# Patient Record
Sex: MALE | Employment: FULL TIME | URBAN - METROPOLITAN AREA
[De-identification: names, ages, dates, MRNs, and addresses within clinical notes are randomized per-mention and may not be internally consistent; named-entity substitution may affect disease eponyms.]

---

## 2022-11-04 ENCOUNTER — TELEPHONE (OUTPATIENT)
Dept: SCHEDULING | Facility: IMAGING CENTER | Age: 22
End: 2022-11-04

## 2023-03-17 ENCOUNTER — OFFICE VISIT (OUTPATIENT)
Dept: URGENT CARE | Facility: CLINIC | Age: 23
End: 2023-03-17
Payer: OTHER GOVERNMENT

## 2023-03-17 VITALS
OXYGEN SATURATION: 94 % | HEIGHT: 69 IN | DIASTOLIC BLOOD PRESSURE: 80 MMHG | WEIGHT: 181 LBS | SYSTOLIC BLOOD PRESSURE: 120 MMHG | HEART RATE: 74 BPM | TEMPERATURE: 97.3 F | RESPIRATION RATE: 16 BRPM | BODY MASS INDEX: 26.81 KG/M2

## 2023-03-17 DIAGNOSIS — J02.9 SORE THROAT: ICD-10-CM

## 2023-03-17 DIAGNOSIS — Z20.818 EXPOSURE TO STREPTOCOCCAL PHARYNGITIS: ICD-10-CM

## 2023-03-17 LAB — S PYO DNA SPEC NAA+PROBE: NOT DETECTED

## 2023-03-17 PROCEDURE — 99203 OFFICE O/P NEW LOW 30 MIN: CPT | Performed by: PHYSICIAN ASSISTANT

## 2023-03-17 PROCEDURE — 87651 STREP A DNA AMP PROBE: CPT | Performed by: PHYSICIAN ASSISTANT

## 2023-03-17 RX ORDER — AMOXICILLIN 500 MG/1
500 CAPSULE ORAL 2 TIMES DAILY
Qty: 20 CAPSULE | Refills: 0 | Status: SHIPPED | OUTPATIENT
Start: 2023-03-17 | End: 2023-03-27

## 2023-03-17 NOTE — PROGRESS NOTES
"Subjective:   Kem Suárez is a 22 y.o. male who presents for Pharyngitis (X 2 days, chills, fatigue, sore throat, body aches, congestion, cough, runny nose, headaches)     Patient presents with chief complaint of ST x 2 days.  Some chills.  Mild cough, runny nose congestion. Sister had strep last week.  Painful swallow.  Eating and drinking with some difficulty.  Denies shortness of breath.  No COVID concerns.  Home COVID test yesterday negative.  No underlying respiratory illnesses.        Medications:  SODIUM FLUORIDE (DENTAL GEL) Gel    Allergies:             Patient has no allergy information on record.    Surgical History:       No past surgical history on file.    Past Social Hx:  Kem Suárez  reports that he has never smoked. He has never used smokeless tobacco. He reports that he does not drink alcohol and does not use drugs.     Past Family Hx:   Kem Suárez family history is not on file.       Problem list, medications, and allergies reviewed by myself today in Epic.     Objective:     /80   Pulse 74   Temp 36.3 °C (97.3 °F) (Temporal)   Resp 16   Ht 1.753 m (5' 9\")   Wt 82.1 kg (181 lb)   SpO2 94%   BMI 26.73 kg/m²     Physical Exam  Vitals and nursing note reviewed.   Constitutional:       General: He is not in acute distress.     Appearance: Normal appearance. He is well-developed. He is not ill-appearing or diaphoretic.   HENT:      Head: Normocephalic.      Right Ear: Tympanic membrane normal.      Left Ear: Tympanic membrane normal.      Nose: Congestion and rhinorrhea present.      Mouth/Throat:      Palate: No mass.      Pharynx: Posterior oropharyngeal erythema present. No pharyngeal swelling, oropharyngeal exudate or uvula swelling.      Tonsils: No tonsillar exudate or tonsillar abscesses. 1+ on the right. 1+ on the left.   Eyes:      Conjunctiva/sclera: Conjunctivae normal.      Pupils: Pupils are equal, round, and reactive to light.   Cardiovascular:      Rate and " Rhythm: Normal rate and regular rhythm.      Pulses: Normal pulses.      Heart sounds: No murmur heard.  Pulmonary:      Effort: Pulmonary effort is normal. No tachypnea, accessory muscle usage or respiratory distress.      Breath sounds: Normal breath sounds. No stridor. No decreased breath sounds, wheezing, rhonchi or rales.   Musculoskeletal:         General: Normal range of motion.      Cervical back: Normal range of motion and neck supple.   Skin:     General: Skin is warm and dry.   Neurological:      Mental Status: He is alert and oriented to person, place, and time.   Psychiatric:         Behavior: Behavior is cooperative.     Results for orders placed or performed in visit on 03/17/23   POCT GROUP A STREP, PCR   Result Value Ref Range    POC Group A Strep, PCR Not Detected Not Detected, Invalid       Assessment/Plan:     Diagnosis and Associated Orders:     1. Sore throat  - POCT GROUP A STREP, PCR        Comments/MDM:    Cepheid strep testing negative.  Suspect viral etiology.  Results communicated to patient via MyChart.  I did let the patient know that I will send a prescription for amoxicillin as he did has a household contact with strep pharyngitis.  He is advised to start the medication if his symptoms worsen or he develops fever chills or other constitutional symptoms.    The patient's presenting symptoms and exam findings most likely are due to a viral etiology.     Symptomatic and supportive care:   Plenty of oral hydration and rest   Over the counter cough suppressant as directed.  Tylenol or ibuprofen for pain and fever as directed.   Warm salt water gargles for sore throat, soft foods, cool liquids.   Saline nasal spray, Flonase, and/or otc sudafed (if no history of hypertension) as a decongestant.   Infection control measures at home. Stay away from people, Hand washing, covering sneeze/cough, disinfect surfaces.   Remain home from work, school, and other populated environments while  ill.  Overall, the patient is well-appearing. They are not hypoxic, afebrile, and a normal pulmonary exam.      Soller gargles, Tylenol/ibuprofen as needed for pain, soft foods, cool liquids.  Swap out  toothbrush in 2 to 3 days time to avoid reinfection.  Patient should to proceed to ED for development of symptoms including but not limited to shortness of breath breath, respiratory distress, increased fever, or worsening symptoms not manageable at home.    I personally reviewed prior external notes and test results pertinent to today's visit.  Red flags discussed as well as indications to present to the Emergency Department.  Supportive care, natural history, differential diagnoses, and indications for immediate follow-up discussed.  Patient expresses understanding and agrees to plan.  Patient denies any other questions or concerns.    Follow-up with the primary care physician for recheck, reevaluation, and consideration of further management.      Please note that this dictation was created using voice recognition software. I have made a reasonable attempt to correct obvious errors, but I expect that there are errors of grammar and possibly content that I did not discover before finalizing the note.    This note was electronically signed by Emelia Lopez PA-C

## 2023-04-05 ENCOUNTER — OFFICE VISIT (OUTPATIENT)
Dept: URGENT CARE | Facility: CLINIC | Age: 23
End: 2023-04-05
Payer: OTHER GOVERNMENT

## 2023-04-05 VITALS
HEIGHT: 69 IN | WEIGHT: 180 LBS | TEMPERATURE: 97.7 F | RESPIRATION RATE: 14 BRPM | HEART RATE: 95 BPM | OXYGEN SATURATION: 94 % | SYSTOLIC BLOOD PRESSURE: 116 MMHG | DIASTOLIC BLOOD PRESSURE: 84 MMHG | BODY MASS INDEX: 26.66 KG/M2

## 2023-04-05 DIAGNOSIS — R05.3 PERSISTENT COUGH FOR 3 WEEKS OR LONGER: ICD-10-CM

## 2023-04-05 DIAGNOSIS — J02.9 PHARYNGITIS, UNSPECIFIED ETIOLOGY: ICD-10-CM

## 2023-04-05 LAB — S PYO DNA SPEC NAA+PROBE: NOT DETECTED

## 2023-04-05 PROCEDURE — 87651 STREP A DNA AMP PROBE: CPT | Performed by: PHYSICIAN ASSISTANT

## 2023-04-05 PROCEDURE — 99213 OFFICE O/P EST LOW 20 MIN: CPT | Performed by: PHYSICIAN ASSISTANT

## 2023-04-05 RX ORDER — AZITHROMYCIN 250 MG/1
TABLET, FILM COATED ORAL
Qty: 6 TABLET | Refills: 0 | Status: SHIPPED | OUTPATIENT
Start: 2023-04-05

## 2023-04-05 RX ORDER — METHYLPREDNISOLONE 4 MG/1
TABLET ORAL
Qty: 21 TABLET | Refills: 0 | Status: SHIPPED | OUTPATIENT
Start: 2023-04-05

## 2023-04-05 ASSESSMENT — ENCOUNTER SYMPTOMS
CHILLS: 1
MYALGIAS: 0
WHEEZING: 0
COUGH: 1
NAUSEA: 0
SPUTUM PRODUCTION: 1
ABDOMINAL PAIN: 0
HEADACHES: 0
DIARRHEA: 0
HEMOPTYSIS: 0
SORE THROAT: 1
SHORTNESS OF BREATH: 0
VOMITING: 0
FEVER: 0

## 2023-04-05 NOTE — PROGRESS NOTES
"Subjective     Kem Suárez is a 22 y.o. male who presents with Sore Throat (X 3 days with congestion, cough.  Denies fever or chills.)            Patient with complaints of cough and sore throat x 3 weeks. Symptoms improved but never went away. Symptoms worsened 3 days ago. He now reports severe sore throat. He has nasal congestion. Chills from 3 weeks ago but nothing since. No known fever. Persistent cough with a mixture between green, yellow and bloody mucous. No wheezing or shortness of breath. No nausea, vomiting, or diarrhea. No body aches.         No past medical history on file.        No past surgical history on file.      No family history on file.      Patient has no known allergies.        Medications, Allergies, and current problem list reviewed today in Epic      Review of Systems   Constitutional:  Positive for chills and malaise/fatigue. Negative for fever.   HENT:  Positive for congestion and sore throat. Negative for ear pain.    Respiratory:  Positive for cough and sputum production. Negative for hemoptysis, shortness of breath and wheezing.    Cardiovascular:  Negative for chest pain and leg swelling.   Gastrointestinal:  Negative for abdominal pain, diarrhea, nausea and vomiting.   Musculoskeletal:  Negative for myalgias.   Skin:  Negative for rash.   Neurological:  Negative for headaches.          All other systems reviewed and are negative.         Objective     /84   Pulse 95   Temp 36.5 °C (97.7 °F) (Temporal)   Resp 14   Ht 1.753 m (5' 9\")   Wt 81.6 kg (180 lb)   SpO2 94%   BMI 26.58 kg/m²      Physical Exam  Constitutional:       General: He is not in acute distress.     Appearance: Normal appearance. He is not ill-appearing.   HENT:      Head: Normocephalic and atraumatic.      Right Ear: Tympanic membrane, ear canal and external ear normal.      Left Ear: Tympanic membrane, ear canal and external ear normal.      Nose: Mucosal edema, congestion and rhinorrhea present. " Rhinorrhea is purulent.      Mouth/Throat:      Mouth: Mucous membranes are moist.      Pharynx: Uvula midline. Pharyngeal swelling, oropharyngeal exudate, posterior oropharyngeal erythema and uvula swelling present.      Tonsils: No tonsillar exudate or tonsillar abscesses. 1+ on the right. 1+ on the left.   Cardiovascular:      Rate and Rhythm: Normal rate and regular rhythm.      Heart sounds: Normal heart sounds.   Pulmonary:      Effort: Pulmonary effort is normal. No respiratory distress.      Breath sounds: Normal breath sounds. No wheezing, rhonchi or rales.   Lymphadenopathy:      Cervical: Cervical adenopathy (slight) present.   Skin:     General: Skin is warm and dry.      Findings: No rash.   Neurological:      General: No focal deficit present.      Mental Status: He is alert and oriented to person, place, and time.   Psychiatric:         Mood and Affect: Mood normal.         Behavior: Behavior normal.         Thought Content: Thought content normal.         Judgment: Judgment normal.                           Assessment & Plan        1. Pharyngitis, unspecified etiology  POCT GROUP A STREP, PCR    azithromycin (ZITHROMAX) 250 MG Tab    methylPREDNISolone (MEDROL DOSEPAK) 4 MG Tablet Therapy Pack      2. Persistent cough for 3 weeks or longer  azithromycin (ZITHROMAX) 250 MG Tab    methylPREDNISolone (MEDROL DOSEPAK) 4 MG Tablet Therapy Pack          - POCT GROUP A STREP, PCR- negative           Current Outpatient Medications:     azithromycin (ZITHROMAX) 250 MG Tab, Take as directed on package. 1 pack., Disp: 6 Tablet, Rfl: 0    methylPREDNISolone (MEDROL DOSEPAK) 4 MG Tablet Therapy Pack, Follow schedule on package instructions., Disp: 21 Tablet, Rfl: 0        Differential diagnoses, Supportive care, and indications for immediate follow-up discussed with patient.   Pathogenesis of diagnosis discussed including typical length and natural progression.   Instructed to return to clinic or nearest  emergency department for any change in condition, further concerns, or worsening of symptoms.        The patient demonstrated a good understanding and agreed with the treatment plan.      Pam Fraire P.A.-C.

## 2024-03-04 ENCOUNTER — OFFICE VISIT (OUTPATIENT)
Dept: MEDICAL GROUP | Facility: PHYSICIAN GROUP | Age: 24
End: 2024-03-04
Payer: OTHER GOVERNMENT

## 2024-03-04 VITALS
HEIGHT: 70 IN | WEIGHT: 176.6 LBS | BODY MASS INDEX: 25.28 KG/M2 | DIASTOLIC BLOOD PRESSURE: 84 MMHG | TEMPERATURE: 97.5 F | OXYGEN SATURATION: 96 % | HEART RATE: 62 BPM | SYSTOLIC BLOOD PRESSURE: 120 MMHG | RESPIRATION RATE: 10 BRPM

## 2024-03-04 DIAGNOSIS — Z11.4 SCREENING FOR HIV (HUMAN IMMUNODEFICIENCY VIRUS): ICD-10-CM

## 2024-03-04 DIAGNOSIS — L70.0 ACNE VULGARIS: ICD-10-CM

## 2024-03-04 DIAGNOSIS — F43.21 SITUATIONAL DEPRESSION: ICD-10-CM

## 2024-03-04 DIAGNOSIS — Z13.228 SCREENING FOR ENDOCRINE, NUTRITIONAL, METABOLIC AND IMMUNITY DISORDER: ICD-10-CM

## 2024-03-04 DIAGNOSIS — Z13.29 SCREENING FOR ENDOCRINE, NUTRITIONAL, METABOLIC AND IMMUNITY DISORDER: ICD-10-CM

## 2024-03-04 DIAGNOSIS — F90.1 ATTENTION DEFICIT HYPERACTIVITY DISORDER (ADHD), PREDOMINANTLY HYPERACTIVE TYPE: ICD-10-CM

## 2024-03-04 DIAGNOSIS — Z13.0 SCREENING FOR IRON DEFICIENCY ANEMIA: ICD-10-CM

## 2024-03-04 DIAGNOSIS — Z11.59 NEED FOR HEPATITIS C SCREENING TEST: ICD-10-CM

## 2024-03-04 DIAGNOSIS — Z91.09 ENVIRONMENTAL ALLERGIES: ICD-10-CM

## 2024-03-04 DIAGNOSIS — Z13.0 SCREENING FOR ENDOCRINE, NUTRITIONAL, METABOLIC AND IMMUNITY DISORDER: ICD-10-CM

## 2024-03-04 DIAGNOSIS — Z13.21 SCREENING FOR ENDOCRINE, NUTRITIONAL, METABOLIC AND IMMUNITY DISORDER: ICD-10-CM

## 2024-03-04 PROBLEM — L98.9 SKIN PROBLEM: Status: RESOLVED | Noted: 2024-03-04 | Resolved: 2024-03-04

## 2024-03-04 PROBLEM — L98.9 SKIN PROBLEM: Status: ACTIVE | Noted: 2024-03-04

## 2024-03-04 PROBLEM — F90.9 ADHD: Status: ACTIVE | Noted: 2024-03-04

## 2024-03-04 PROBLEM — T78.40XA ALLERGY: Status: ACTIVE | Noted: 2024-03-04

## 2024-03-04 PROBLEM — F33.1 MODERATE EPISODE OF RECURRENT MAJOR DEPRESSIVE DISORDER (HCC): Status: ACTIVE | Noted: 2024-03-04

## 2024-03-04 PROCEDURE — 3079F DIAST BP 80-89 MM HG: CPT

## 2024-03-04 PROCEDURE — 99214 OFFICE O/P EST MOD 30 MIN: CPT

## 2024-03-04 PROCEDURE — 3074F SYST BP LT 130 MM HG: CPT

## 2024-03-04 RX ORDER — FLUTICASONE PROPIONATE 50 MCG
1 SPRAY, SUSPENSION (ML) NASAL DAILY
Qty: 16 G | Refills: 0 | Status: SHIPPED | OUTPATIENT
Start: 2024-03-04

## 2024-03-04 ASSESSMENT — PATIENT HEALTH QUESTIONNAIRE - PHQ9
SUM OF ALL RESPONSES TO PHQ QUESTIONS 1-9: 10
5. POOR APPETITE OR OVEREATING: 0 - NOT AT ALL
CLINICAL INTERPRETATION OF PHQ2 SCORE: 3

## 2024-03-04 ASSESSMENT — ENCOUNTER SYMPTOMS
FEVER: 0
CHILLS: 0
ROS SKIN COMMENTS: ACNE
HEADACHES: 0
EYE PAIN: 0
COUGH: 0
BRUISES/BLEEDS EASILY: 0
NAUSEA: 0
DEPRESSION: 1
DIZZINESS: 0
HEMOPTYSIS: 0
BLURRED VISION: 0
WEIGHT LOSS: 0
PALPITATIONS: 0
HEARTBURN: 0

## 2024-03-04 NOTE — PROGRESS NOTES
CC:   Chief Complaint   Patient presents with    Establish Care     Referral to derm         HPI: Patient is a 23 y.o. male presenting with the following issues:     Problem List Items Addressed This Visit       ADHD     This is a chronic, uncontrolled medical condition   Patient reports he was diagnosed when he was 8 years old. He reports that he was treated with adderall and daytrona, as well as wellbutrin. He reports that the adderall and daytrona dulled his personality. He did not notice much of a difference with the wellbutrin. He sometimes feels that his symptoms are not well managed, has some issues with executive functioning and self control in regards to living outside of his ideology and lifestyle choices. He has done therapy as a child, didn't feel that it was helpful. Interested in a referral to a therapist who can help with impulse control and executive functioning.          Relevant Orders    Referral for Individual Therapy    Situational depression     Chronic, not at goal. He was recently excommunicated from his [employer]. He is a [employer]. He was part of that particular [employer] for 3 years and there were mandatory things he had to do that he did not feel comfortable with. He is dealing with some abandonment from this. He reports that he has had some episodes of situational depression before but has not been on medications specifically for this. Interested in a referral for therapy to help with situational depression and coping mechanisms.          Relevant Orders    Referral for Individual Therapy    Acne vulgaris     Chronic, not at goal. Ongoing issues with back acne that has not responded well to OTC medications although he is not sure what he has tried.     On exam he has significant cystic acne and blackheads noted over the entirety of his back with two predominant cysts that are not infected. Refer to dermatology for further evaluation.          Relevant Orders    Referral to  Dermatology    Environmental allergies     This is a chronic, stable medical condition.   Intermittent issues with sinus congestion in certain environments. Usually uses decongestants which are not overly helpful. Recommend OTC flonase for now, refer for allergy testing. Not consistent enough to required second generation antihistamine, patient is primarily interested in what is triggering his symptoms so he can make lifestyle changes.          Relevant Medications    fluticasone (FLONASE) 50 MCG/ACT nasal spray    Other Relevant Orders    Referral to Allergy     Other Visit Diagnoses       Need for hepatitis C screening test        Relevant Orders    HEP C VIRUS ANTIBODY    Screening for HIV (human immunodeficiency virus)        Relevant Orders    HIV AG/AB COMBO ASSAY SCREENING    Screening for endocrine, nutritional, metabolic and immunity disorder        Relevant Orders    Comp Metabolic Panel    Lipid Profile    TSH    Screening for iron deficiency anemia        Relevant Orders    CBC WITHOUT DIFFERENTIAL            Patient Active Problem List    Diagnosis Date Noted    ADHD 03/04/2024    Situational depression 03/04/2024    Acne vulgaris 03/04/2024    Environmental allergies 03/04/2024       Current Outpatient Medications   Medication Sig Dispense Refill    fluticasone (FLONASE) 50 MCG/ACT nasal spray Administer 1 Spray into affected nostril(S) every day. 16 g 0    azithromycin (ZITHROMAX) 250 MG Tab Take as directed on package. 1 pack. (Patient not taking: Reported on 3/4/2024) 6 Tablet 0    methylPREDNISolone (MEDROL DOSEPAK) 4 MG Tablet Therapy Pack Follow schedule on package instructions. (Patient not taking: Reported on 3/4/2024) 21 Tablet 0     No current facility-administered medications for this visit.       Allergies as of 03/04/2024    (No Known Allergies)       Health Maintenance: Outstanding health maintenance items were ordered if applicable to patient including screening and preventative  "measures.     Review of Systems: Review of Systems   Constitutional:  Negative for chills, fever and weight loss.   HENT:  Negative for hearing loss and tinnitus.    Eyes:  Negative for blurred vision and pain.   Respiratory:  Negative for cough and hemoptysis.    Cardiovascular:  Negative for chest pain, palpitations and leg swelling.   Gastrointestinal:  Negative for heartburn and nausea.   Genitourinary:  Negative for dysuria.   Musculoskeletal:  Negative for joint pain.   Skin:  Negative for itching and rash.        acne   Neurological:  Negative for dizziness and headaches.   Endo/Heme/Allergies:  Does not bruise/bleed easily.   Psychiatric/Behavioral:  Positive for depression.         Situational       Objective/Assessment:    Exam: /84   Pulse 62   Temp 36.4 °C (97.5 °F) (Temporal)   Resp (!) 10   Ht 1.778 m (5' 10\")   Wt 80.1 kg (176 lb 9.6 oz)   SpO2 96%  Body mass index is 25.34 kg/m².    Physical Exam  Constitutional:       Appearance: Normal appearance.   Eyes:      Extraocular Movements: Extraocular movements intact.      Pupils: Pupils are equal, round, and reactive to light.   Neck:      Thyroid: No thyromegaly.   Cardiovascular:      Rate and Rhythm: Normal rate and regular rhythm.      Heart sounds: Normal heart sounds.   Pulmonary:      Effort: Pulmonary effort is normal.      Breath sounds: Normal breath sounds.   Abdominal:      General: Abdomen is flat. Bowel sounds are normal.      Palpations: Abdomen is soft.   Musculoskeletal:         General: Normal range of motion.      Cervical back: Normal range of motion and neck supple.   Lymphadenopathy:      Cervical: No cervical adenopathy.   Skin:     General: Skin is warm and dry.      Findings: Acne present.      Comments: Multople closed comedones and blackheads noted over the back, two large cysts noted over the posterior right shoulder and posterior scapula that are not erythematous.    Neurological:      General: No focal deficit " present.      Mental Status: He is alert and oriented to person, place, and time.         Follow-up: Return in about 1 year (around 3/4/2025) for AWV, sooner pending results.    Track down immunization records to see if there are outstanding vaccines recommended.     Please note that this dictation was created using voice recognition software. I have made every reasonable attempt to correct obvious errors, but I expect that there are errors of grammar and possibly content that I did not discover before finalizing the note.    Electronically signed by LAZARO Landry on March 4, 2024

## 2024-03-04 NOTE — ASSESSMENT & PLAN NOTE
Chronic, not at goal. Ongoing issues with back acne that has not responded well to OTC medications although he is not sure what he has tried.     On exam he has significant cystic acne and blackheads noted over the entirety of his back with two predominant cysts that are not infected. Refer to dermatology for further evaluation.

## 2024-03-04 NOTE — ASSESSMENT & PLAN NOTE
Chronic, not at goal. He was recently excommunicated from his [employer]. He is a [employer]. He was part of that particular [employer] for 3 years and there were mandatory things he had to do that he did not feel comfortable with. He is dealing with some abandonment from this. He reports that he has had some episodes of situational depression before but has not been on medications specifically for this. Interested in a referral for therapy to help with situational depression and coping mechanisms.

## 2024-03-04 NOTE — ASSESSMENT & PLAN NOTE
This is a chronic, stable medical condition.   Intermittent issues with sinus congestion in certain environments. Usually uses decongestants which are not overly helpful. Recommend OTC flonase for now, refer for allergy testing. Not consistent enough to required second generation antihistamine, patient is primarily interested in what is triggering his symptoms so he can make lifestyle changes.

## 2024-03-04 NOTE — ASSESSMENT & PLAN NOTE
This is a chronic, uncontrolled medical condition   Patient reports he was diagnosed when he was 8 years old. He reports that he was treated with adderall and daytrona, as well as wellbutrin. He reports that the adderall and daytrona dulled his personality. He did not notice much of a difference with the wellbutrin. He sometimes feels that his symptoms are not well managed, has some issues with executive functioning and self control in regards to living outside of his ideology and lifestyle choices. He has done therapy as a child, didn't feel that it was helpful. Interested in a referral to a therapist who can help with impulse control and executive functioning.

## 2024-06-13 DIAGNOSIS — F90.1 ATTENTION DEFICIT HYPERACTIVITY DISORDER (ADHD), PREDOMINANTLY HYPERACTIVE TYPE: ICD-10-CM

## 2024-06-13 DIAGNOSIS — L70.0 ACNE VULGARIS: ICD-10-CM

## 2024-06-13 DIAGNOSIS — Z91.09 ENVIRONMENTAL ALLERGIES: ICD-10-CM

## 2024-08-20 ENCOUNTER — OFFICE VISIT (OUTPATIENT)
Dept: DERMATOLOGY | Facility: IMAGING CENTER | Age: 24
End: 2024-08-20
Payer: OTHER GOVERNMENT

## 2024-08-20 DIAGNOSIS — Z51.81 THERAPEUTIC DRUG MONITORING: ICD-10-CM

## 2024-08-20 DIAGNOSIS — L70.0 ACNE VULGARIS: ICD-10-CM

## 2024-08-20 PROCEDURE — 99204 OFFICE O/P NEW MOD 45 MIN: CPT | Performed by: STUDENT IN AN ORGANIZED HEALTH CARE EDUCATION/TRAINING PROGRAM

## 2024-08-20 NOTE — PROGRESS NOTES
[facility] Dermatology Clinic Note    CC:   Chief Complaint   Patient presents with    [facility] Care    Acne       HPI:  Kem Suárez is a 23 y.o. male who presents today as new patient for evaluation and management of    Back acne:   Has had for many years, has tried multiple over the counter soaps and prescriptions from dermatologists and nothing seems to help much.   Has tried benzoyl peroxide soap from [employer]. Worked at first and then stopped working. Has tried other creams - not sure names. Thinks has tried oral antibiotics and this didn't help much/flared after.   Asks about treatments for scars.   Notes occasionally painful draining lesions in groin. Never has in armpits. No joint pains. Scalp clear.       ROS: no fevers/chills. Other pertinent positives and negatives as above.     Meds/PMH/PSH/FamHx/Allergies: reviewed relevant to my specialty in the chart.       PHYSICAL EXAM:   A focused skin exam was completed of the face, back, upper chest with the following pertinent findings listed below.   Remaining above-listed examined areas within normal limits / negative for rashes or lesions.    Upper and lower back with:   - scattered subcutaneous nodules w/ overlying punctum c/w EICs  - pitting and box scar scars  - few inflammatory acneiform nodules  - white soft papules     Declined groin exam today        IMPRESSION / PLAN:    Acne vulgaris - severe, inflammatory, scaring  May have component of follicular occlusion triad -- eruptive vellus cysts, steatocystoma multiplex, HS     - educated patient about diagnosis, management options, and expectations of treatment  - given lack of response/clearance with oral antibiotics, topical soaps, they would like to pursue alternative treatment. I discussed options of isotretinoin vs repeat courses of antibiotics, topical retinoids, antibacterial soaps and solutions. Introductory packet provided for the family to review. I extensively discussed the potential adverse  effects of treatment, including, but not limited to ocular disturbances, bony abnormailities, lipid disturbances, liver inflammation, reported associations with inflammatory bowel disease, depression, endocrine disturbances, hematologic abnormalities, and muscle aches. More common side effects of dry skin/eyes/mucosa, photosensitivity, sticky skin, hair loss, fragile nails, paronychia, myalgias, fatigue, headache, and abdominal pain/nausea/diarrhea were also reviewed.  - noted that patient has history of situational depression on medical problem. Discussed mood change risk -- to let me know if he has any changes in mood asap, that in retrospective studies this effect hasn't been proven to be causal.   - Patient is aware they cannot share the medication, and they cannot donate blood during treatment, and through one month after completion of treatment.  - we discussed the need for baseline blood work, and follow-up blood work once treatment is initiated   - CMP, CBC, lipid panel ordered, not on file   - hold treatments for scarring in future once acne resolved, excisions for cysts on back etc     - patient aware to notify me asap if any new/concerning symptoms  - lubricating eye drops, nasal spray, vaseline  - dose: start isotretinoin 40mg daily (pending labs)   - [employer] ID: [id number]  Goal dose: 120-150mg/kg; 80 kg = 9600mg-43371lt; total cumulative dose on day of evaluation: 0 mg          Follow up:  1 month        Clare Lai MD   [facility]

## 2024-09-12 ENCOUNTER — HOSPITAL ENCOUNTER (OUTPATIENT)
Dept: LAB | Facility: MEDICAL CENTER | Age: 24
End: 2024-09-12
Attending: STUDENT IN AN ORGANIZED HEALTH CARE EDUCATION/TRAINING PROGRAM
Payer: OTHER GOVERNMENT

## 2024-09-12 DIAGNOSIS — Z51.81 THERAPEUTIC DRUG MONITORING: ICD-10-CM

## 2024-09-12 LAB
ALBUMIN SERPL BCP-MCNC: 4.3 G/DL (ref 3.2–4.9)
ALBUMIN/GLOB SERPL: 1.3 G/DL
ALP SERPL-CCNC: 85 U/L (ref 30–99)
ALT SERPL-CCNC: 22 U/L (ref 2–50)
ANION GAP SERPL CALC-SCNC: 12 MMOL/L (ref 7–16)
AST SERPL-CCNC: 24 U/L (ref 12–45)
BILIRUB CONJ SERPL-MCNC: <0.2 MG/DL (ref 0.1–0.5)
BILIRUB INDIRECT SERPL-MCNC: NORMAL MG/DL (ref 0–1)
BILIRUB SERPL-MCNC: 0.2 MG/DL (ref 0.1–1.5)
BUN SERPL-MCNC: 16 MG/DL (ref 8–22)
CALCIUM ALBUM COR SERPL-MCNC: 9.2 MG/DL (ref 8.5–10.5)
CALCIUM SERPL-MCNC: 9.4 MG/DL (ref 8.5–10.5)
CHLORIDE SERPL-SCNC: 102 MMOL/L (ref 96–112)
CHOLEST SERPL-MCNC: 183 MG/DL (ref 100–199)
CO2 SERPL-SCNC: 24 MMOL/L (ref 20–33)
CREAT SERPL-MCNC: 0.77 MG/DL (ref 0.5–1.4)
ERYTHROCYTE [DISTWIDTH] IN BLOOD BY AUTOMATED COUNT: 40.1 FL (ref 35.9–50)
GFR SERPLBLD CREATININE-BSD FMLA CKD-EPI: 130 ML/MIN/1.73 M 2
GLOBULIN SER CALC-MCNC: 3.2 G/DL (ref 1.9–3.5)
GLUCOSE SERPL-MCNC: 94 MG/DL (ref 65–99)
HCT VFR BLD AUTO: 47 % (ref 42–52)
HDLC SERPL-MCNC: 64 MG/DL
HGB BLD-MCNC: 15.5 G/DL (ref 14–18)
LDLC SERPL CALC-MCNC: 109 MG/DL
MCH RBC QN AUTO: 28.1 PG (ref 27–33)
MCHC RBC AUTO-ENTMCNC: 33 G/DL (ref 32.3–36.5)
MCV RBC AUTO: 85.3 FL (ref 81.4–97.8)
PLATELET # BLD AUTO: 346 K/UL (ref 164–446)
PMV BLD AUTO: 9.5 FL (ref 9–12.9)
POTASSIUM SERPL-SCNC: 4.5 MMOL/L (ref 3.6–5.5)
PROT SERPL-MCNC: 7.5 G/DL (ref 6–8.2)
RBC # BLD AUTO: 5.51 M/UL (ref 4.7–6.1)
SODIUM SERPL-SCNC: 138 MMOL/L (ref 135–145)
TRIGL SERPL-MCNC: 52 MG/DL (ref 0–149)
WBC # BLD AUTO: 7.6 K/UL (ref 4.8–10.8)

## 2024-09-12 PROCEDURE — 36415 COLL VENOUS BLD VENIPUNCTURE: CPT

## 2024-09-12 PROCEDURE — 80053 COMPREHEN METABOLIC PANEL: CPT

## 2024-09-12 PROCEDURE — 80061 LIPID PANEL: CPT

## 2024-09-12 PROCEDURE — 85027 COMPLETE CBC AUTOMATED: CPT

## 2024-09-12 PROCEDURE — 82248 BILIRUBIN DIRECT: CPT

## 2024-10-30 ENCOUNTER — OFFICE VISIT (OUTPATIENT)
Dept: DERMATOLOGY | Facility: IMAGING CENTER | Age: 24
End: 2024-10-30
Payer: OTHER GOVERNMENT

## 2024-10-30 DIAGNOSIS — Z51.81 THERAPEUTIC DRUG MONITORING: ICD-10-CM

## 2024-10-30 DIAGNOSIS — L70.0 ACNE VULGARIS: ICD-10-CM

## 2024-10-30 RX ORDER — ISOTRETINOIN 40 MG/1
40 CAPSULE ORAL 2 TIMES DAILY
Qty: 60 CAPSULE | Refills: 0 | Status: SHIPPED | OUTPATIENT
Start: 2024-10-30

## 2024-12-05 ENCOUNTER — OFFICE VISIT (OUTPATIENT)
Dept: DERMATOLOGY | Facility: IMAGING CENTER | Age: 24
End: 2024-12-05
Payer: OTHER GOVERNMENT

## 2024-12-05 DIAGNOSIS — Z51.81 THERAPEUTIC DRUG MONITORING: ICD-10-CM

## 2024-12-05 DIAGNOSIS — L70.0 ACNE VULGARIS: ICD-10-CM

## 2024-12-05 PROCEDURE — 99213 OFFICE O/P EST LOW 20 MIN: CPT | Performed by: STUDENT IN AN ORGANIZED HEALTH CARE EDUCATION/TRAINING PROGRAM

## 2024-12-05 NOTE — PROGRESS NOTES
[facility] Note    CC:   Chief Complaint   Patient presents with    Follow-Up    Acne     Pt states skin is very dry. Pt has seen slight improvement       HPI:    Here for acne, isotretinoin follow up today.     Completed two months - 40 mg, then 80 mg. He is about 2 weeks through his second month - he had a delay in picking it up.   Acne is still present, possibly slightly better.    Tolerating well, +dry eyes/skin - no abdominal pain, nausea/vomiting/diarrhea/blood in the stool, myalgias, arthralgias, mood disturbances, fever/sore throat/cough.         Acne History:   Duration: many years  Prior treatments: OTC soaps (BPO, others) and prescriptions (including abx) from dermatologists and nothing seems to help much.   Asks about treatments for scars.   Notes occasionally painful draining lesions in groin. Never has in armpits. No joint pains. Scalp clear.       ROS: no fevers/chills. Other pertinent positives and negatives as above.     Meds/PMH/PSH/FamHx/Allergies: reviewed relevant to my specialty in the chart.       PHYSICAL EXAM:   A focused skin exam was completed of the face, back, upper chest with the following pertinent findings listed below.     Upper and lower back with:    - continued scattered subcutaneous nodules w/ overlying punctum c/w EICs  - pitting and box scar scars  - few inflammatory acneiform nodules - fewer than prior   - white soft papules   - keloidal scar on right shoulder         IMPRESSION / PLAN:    Acne vulgaris - severe, inflammatory, scaring  May have component of follicular occlusion triad -- eruptive vellus cysts, steatocystoma multiplex, HS     - educated patient about diagnosis, management options, and expectations of treatment  - given lack of response/clearance with oral antibiotics, topical soaps, they would like to pursue alternative treatment. I discussed options of isotretinoin vs repeat courses of antibiotics, topical retinoids, antibacterial soaps and  solutions. Introductory packet provided for the family to review. I extensively discussed the potential adverse effects of treatment, including, but not limited to ocular disturbances, bony abnormailities, lipid disturbances, liver inflammation, reported associations with inflammatory bowel disease, depression, endocrine disturbances, hematologic abnormalities, and muscle aches. More common side effects of dry skin/eyes/mucosa, photosensitivity, sticky skin, hair loss, fragile nails, paronychia, myalgias, fatigue, headache, and abdominal pain/nausea/diarrhea were also reviewed.  - noted that patient has history of situational depression on medical problem. Discussed mood change risk -- to let me know if he has any changes in mood asap, that in retrospective studies this effect hasn't been proven to be causal.   - Patient is aware they cannot share the medication, and they cannot donate blood during treatment, and through one month after completion of treatment.  - we discussed the need for baseline blood work, and follow-up blood work once treatment is initiated   - baseline CMP,CBC, lipids wnl   - hold treatments for scarring in future once acne resolved, excisions for cysts on back etc     - patient aware to notify me asap if any new/concerning symptoms  - lubricating eye drops, nasal spray, vaseline  - dose: continue isotretinoin 80mg daily (pending labs acceptable, will order)   - repeat ALT, AST, TG today - didn't get labs after last visit  - ipledge ID: [id number]  Goal dose: 120-150mg/kg; 80 kg = 9600mg-[id number]; total cumulative dose on day of evaluation: 3600mg (once he has finished 80 mg month)           Follow up:  6 weeks (has 2 weeks left in current course)       Clare Lai MD   [facility]

## 2025-01-16 ENCOUNTER — APPOINTMENT (OUTPATIENT)
Dept: DERMATOLOGY | Facility: IMAGING CENTER | Age: 25
End: 2025-01-16
Payer: OTHER GOVERNMENT

## 2025-01-16 NOTE — PROGRESS NOTES
[facility] Note    CC:   No chief complaint on file.      HPI:    Here for acne, isotretinoin follow up today.     Completed three months, 40, 80, 80. He is about 2 weeks through his second month - he had a delay in picking it up.   Acne is still present, possibly slightly better.    Tolerating well, +dry eyes/skin - no abdominal pain, nausea/vomiting/diarrhea/blood in the stool, myalgias, arthralgias, mood disturbances, fever/sore throat/cough.         Acne History:   Duration: many years  Prior treatments: OTC soaps (BPO, others) and prescriptions (including abx) from dermatologists and nothing seems to help much.   Asks about treatments for scars.   Notes occasionally painful draining lesions in groin. Never has in armpits. No joint pains. Scalp clear.       ROS: no fevers/chills. Other pertinent positives and negatives as above.     Meds/PMH/PSH/FamHx/Allergies: reviewed relevant to my specialty in the chart.       PHYSICAL EXAM:   A focused skin exam was completed of the face, back, upper chest with the following pertinent findings listed below.     Upper and lower back with:    - continued scattered subcutaneous nodules w/ overlying punctum c/w EICs  - pitting and box scar scars  - few inflammatory acneiform nodules - fewer than prior   - white soft papules   - keloidal scar on right shoulder         IMPRESSION / PLAN:    Acne vulgaris - severe, inflammatory, scaring  May have component of follicular occlusion triad -- eruptive vellus cysts, steatocystoma multiplex, HS     - educated patient about diagnosis, management options, and expectations of treatment  - given lack of response/clearance with oral antibiotics, topical soaps, they would like to pursue alternative treatment. I discussed options of isotretinoin vs repeat courses of antibiotics, topical retinoids, antibacterial soaps and solutions. Introductory packet provided for the family to review. I extensively discussed the potential  adverse effects of treatment, including, but not limited to ocular disturbances, bony abnormailities, lipid disturbances, liver inflammation, reported associations with inflammatory bowel disease, depression, endocrine disturbances, hematologic abnormalities, and muscle aches. More common side effects of dry skin/eyes/mucosa, photosensitivity, sticky skin, hair loss, fragile nails, paronychia, myalgias, fatigue, headache, and abdominal pain/nausea/diarrhea were also reviewed.  - noted that patient has history of situational depression on medical problem. Discussed mood change risk -- to let me know if he has any changes in mood asap, that in retrospective studies this effect hasn't been proven to be causal.   - Patient is aware they cannot share the medication, and they cannot donate blood during treatment, and through one month after completion of treatment.  - we discussed the need for baseline blood work, and follow-up blood work once treatment is initiated   - baseline CMP,CBC, lipids wnl   - hold treatments for scarring in future once acne resolved, excisions for cysts on back etc     - patient aware to notify me asap if any new/concerning symptoms  - lubricating eye drops, nasal spray, vaseline  - dose: continue isotretinoin 80mg daily (pending labs acceptable, will order)   - repeat ALT, AST, TG today - didn't get labs after last visit  - ipledge ID: [id number]  Goal dose: 120-150mg/kg; 80 kg = 9600mg-[id number]; total cumulative dose on day of evaluation: 6000 mg (once he has finished 80 mg month)           Follow up:  4 weeks       Clare Lai MD   [facility]